# Patient Record
Sex: FEMALE | Race: WHITE | Employment: FULL TIME | URBAN - METROPOLITAN AREA
[De-identification: names, ages, dates, MRNs, and addresses within clinical notes are randomized per-mention and may not be internally consistent; named-entity substitution may affect disease eponyms.]

---

## 2022-03-24 RX ORDER — BACLOFEN 20 MG
500 TABLET ORAL DAILY
COMMUNITY

## 2022-03-24 RX ORDER — NEBIVOLOL 2.5 MG/1
2.5 TABLET ORAL DAILY
COMMUNITY

## 2022-03-24 RX ORDER — LEVOTHYROXINE AND LIOTHYRONINE 38; 9 UG/1; UG/1
60 TABLET ORAL DAILY
COMMUNITY

## 2022-03-24 RX ORDER — ESTRADIOL 0.5 MG/.5G
0.5 GEL TOPICAL DAILY
COMMUNITY

## 2022-03-24 RX ORDER — OMALIZUMAB 150 MG/ML
150 INJECTION, SOLUTION SUBCUTANEOUS
COMMUNITY

## 2022-03-24 NOTE — PERIOP NOTES
[facility]  Ambulatory Surgery Unit  Pre-operative Instructions    Surgery/Procedure Date  Friday April 1st            Tentative Arrival Time TBD      1. On the day of your surgery/procedure, please report to the Ambulatory Surgery Unit Registration Desk and sign in at your designated time. The Ambulatory Surgery Unit is located in [facility] on the [facility] side of the [facility] across from the [facility]. Please have all of your health insurance cards and a photo ID. **Due to current COVID restrictions, only ONE adult may accompany you the day of the procedure. We have limited seating available. If our waiting room is at capacity, your ride may be asked to remain in their vehicle. No children are allowed in the waiting room. 2. You must have someone with you to drive you home, as you should not drive a car for 24 hours following anesthesia. Please make arrangements for a responsible adult friend or family member to stay with you for at least the first 24 hours after your surgery. 3. Do not have anything to eat or drink (including water, gum, mints, coffee, juice) after 11:59 PM  Thursday 3/31/2022. This may not apply to medications prescribed by your physician. (Please note below the special instructions with medications to take the morning of surgery, if applicable.)    4. We recommend you do not drink any alcoholic beverages for 24 hours before and after your surgery. 5. Contact your surgeons office for instructions on the following medications: non-steroidal anti-inflammatory drugs (i.e. Advil, Aleve), vitamins, and supplements. (Some surgeons will want you to stop these medications prior to surgery and others may allow you to take them)   **If you are currently taking Plavix, Coumadin, Aspirin and/or other blood-thinning agents, contact your surgeon for instructions. ** Your surgeon will partner with the physician prescribing these medications to determine if it is safe to stop or if you need to continue taking. Please do not stop taking these medications without instructions from your surgeon. 6. In an effort to help prevent surgical site infection, we ask that you shower with an anti-bacterial soap (i.e. Dial/Safeguard, or the soap provided to you at your preadmission testing appointment) for 3 days prior to and on the morning of surgery, using a fresh towel after each shower. (Please begin this process with fresh bed linens.) Do not apply any lotions, powders, or deodorants after the shower on the day of your procedure. If applicable, please do not shave the operative site for 48 hours prior to surgery. 7. Wear comfortable clothes. Wear glasses instead of contacts. Do not bring any jewelry or money (other than copays or fees as instructed). Do not wear make-up, particularly mascara, the morning of your surgery. Do not wear nail polish, particularly if you are having foot /hand surgery. Wear your hair loose or down, no ponytails, buns, trung pins or clips. All body piercings must be removed. 8. You should understand that if you do not follow these instructions your surgery may be cancelled. If your physical condition changes (i.e. fever, cold or flu) please contact your surgeon as soon as possible. 9. It is important that you be on time. If a situation occurs where you may be late, or if you have any questions or problems, please call [phone].    10. Your surgery time may be subject to change. You will receive a phone call the day prior to surgery to confirm your arrival time. 11. Pediatric patients: please bring a change of clothes, diapers, bottle/sippy cup, pacifier, etc.      Special Instructions: Take all medications and inhalers, as prescribed, on the morning of surgery with a sip of water EXCEPT: n/a      Insulin Dependent Diabetic patients: Take your diabetic medications as prescribed the day before surgery.   Hold all diabetic For information on Fall & Injury Prevention, visit [email or web address] medications the day of surgery. If you are scheduled to arrive for surgery after 8:00 AM, and your AM blood sugar is >200, please call Ambulatory Surgery. I understand a pre-operative phone call will be made to verify my surgery time. In the event that I am not available, I give permission for a message to be left on my answering service and/or with another person?       Yes    Reviewed instructions via telephone, patient verbalized understanding         ___________________      ___________________      ________________  (Signature of Patient)          (Witness)                   (Date and Time)

## 2022-03-25 RX ORDER — IBUPROFEN 200 MG
400 TABLET ORAL
COMMUNITY

## 2022-03-25 RX ORDER — ACETAMINOPHEN 500 MG
1000 TABLET ORAL
COMMUNITY

## 2022-03-25 RX ORDER — MELOXICAM 15 MG/1
15 TABLET ORAL
COMMUNITY

## 2022-03-30 PROBLEM — M75.111 NONTRAUMATIC INCOMPLETE TEAR OF RIGHT ROTATOR CUFF: Status: ACTIVE | Noted: 2022-03-30

## 2022-03-30 NOTE — H&P
PRE- OP History and Physical                             Subjective:     Patient is a 73 y.o. female presented with a history of R shoulder pain. She states that she plays pickleball multiple times per week and notes that she had a sharp pain with an overhead hit. She notes of previous treatment of physical therapy and Medrol Dosepak which has failed to provide relief. Her pain is persistent and she notes of night awakening due to pain. She notes of clicking in her R shoulder. She rates her pain a 6/10 on the pain scale. .  The patient's condition has been resistant to non-operative treatment and is being admitted for surgical management of this condition. 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 Authorizing Provider   meloxicam (MOBIC) 15 mg tablet Take 15 mg by mouth daily as needed for Pain. Yes Provider, Historical   acetaminophen (TYLENOL) 500 mg tablet Take 1,000 mg by mouth every six (6) hours as needed for Pain. Yes Provider, Historical   ibuprofen (MOTRIN) 200 mg tablet Take 400 mg by mouth every eight (8) hours as needed for Pain. Yes Provider, Historical   fluticasone propionate (FLOVENT DISKUS) 50 mcg/actuation inhaler Take 1 Puff by inhalation two (2) times a day. Yes Provider, Historical   thyroid, Pork, ([place] Thyroid) 60 mg tablet Take 60 mg by mouth daily.    Yes Provider, Historical   omalizumab (Xolair) 150 mg/mL syrg 150 mg by SubCUTAneous route every thirty (30) days. Yes Provider, Historical   Estradiol (DivigeL) 0.5 mg/0.5 gram (0.1 %) glpk 0.5 mg by TransDERmal route daily. Yes Provider, Historical   magnesium oxide Nathaniel Long) 500 mg tab Take 500 mg by mouth daily. 500-1000 mg daily   Yes Provider, Historical   nebivoloL (Bystolic) 2.5 mg tablet Take 2.5 mg by mouth daily. Yes Provider, Historical     Allergies   Allergen Reactions    Amoxicillin Hives    Oxycodone Anxiety     Pt states makes her anxious, antsy, and nauseous     Tramadol Nausea and Vomiting      Social History     Tobacco Use    Smoking status: Never Smoker    Smokeless tobacco: Never Used   Substance Use Topics    Alcohol use: Yes     Alcohol/week: 2.0 standard drinks     Types: 1 Glasses of wine, 1 Shots of liquor per week     Comment: once per month      History reviewed. No pertinent family history. Review of Systems  A comprehensive review of systems was negative except for that written in the HPI. Objective:     No data found. Visit Vitals  Ht 5' 4\" (1.626 m)   Wt 74.4 kg (164 lb)   BMI 28.15 kg/m²     General:  Alert, cooperative, no distress, appears stated age. Head:  Normocephalic, without obvious abnormality, atraumatic. Eyes:  Conjunctivae/corneas clear. PERRL, EOMs intact. Ears:  Normal TMs and external ear canals both ears. Nose: Nares normal. Septum midline. Mucosa normal. No drainage or sinus tenderness. Throat: Lips, mucosa, and tongue normal. Teeth and gums normal.   Neck: Supple, symmetrical, trachea midline, no adenopathy, thyroid: no enlargement/tenderness/nodules, no carotid bruit and no JVD. Back:   Symmetric, no curvature. ROM normal. No CVA tenderness. Lungs:   Clear to auscultation bilaterally. Chest wall:  No tenderness or deformity. Heart:  Regular rate and rhythm, S1, S2, no murmur, click, rub or gallop. Abdomen:   Soft, non-tender.  Bowel sounds normal. No masses,  No organomegaly. Extremities: Extremities normal except Physical exam of R shoulder shows , external rotation 70, internal rotation T8, positive AC pain, positive cross body abduction, negative jerk, normal skin, normal pulse. , atraumatic, no cyanosis or edema. Pulses: 2+ and symmetric all extremities. Skin: Skin color, texture, turgor normal. No rashes or lesions   Lymph nodes: Cervical, supraclavicular, and axillary nodes normal.   Neurologic: CNII-XII intact. Neurovascular exam intact in distal extremities        Imaging Review  I have personally and independently reviewed MRI and the report of R shoulder on 2/28/2022, which reveals grade 3 partial rotator cuff tear and AC arthritis. Assessment:     Active Problems:    Nontraumatic incomplete tear of right rotator cuff (3/30/2022)        Plan:   Juan Villagran has grade 3 partial rotator cuff tear in her R shoulder. I discussed the natural history and natural progression of diagnoses. I reviewed the patient's medical record, previous office notes, and  discussed findings, imaging, impression, treatment options, and plan with the patient. Treatment options discussed to include no intervention, prescription strength oral anti-inflammatories, cortisone injections, oral steroids, therapy, MRI, and surgery.      This is an ongoing problem. Previous conservative treatment of Medrol Dosepak and physical therapy has failed to provide relief. MRI shows grade 3 partial rotator cuff tear and AC arthritis. She has an abnormal AC joint as seen on MRI but no symptoms during physical exam so we will just fix the rotator cuff. Discussed surgical intervention of arthroscopic rotator cuff repair. Pros, cons, and recovery time of surgery was discussed.      Discussed that she is high risk, due to demographics, for adhesive capsulitis.  Discussed that if adhesive capsulitis is present at the time of surgery then surgery will not be performed at the time. Wait for adhesive capsulitis to resolve prior to surgical repair.      She states that she can't take NSAIDs or pain killers due to side effects in the kidneys.      After discussion and answering questions, it was elected to proceed with arthroscopic rotator cuff repair. Handout on surgery was given. Follow up for surgery.     Operative and non-operative treatments have been discussed with the patient including risks and benefits of each. After consideration of risks, benefits limitations to the consented procedures and alternative options for treatment, the patient has consented to surgical interventions. Questions were answered and Pre-op teaching was completed.       HEATHER Mitchell

## 2022-03-31 ENCOUNTER — ANESTHESIA EVENT (OUTPATIENT)
Dept: SURGERY | Age: 67
End: 2022-03-31
Payer: COMMERCIAL

## 2022-03-31 NOTE — PERIOP NOTES
Patient called with question about which facility which patient is scheduled here at [facility] and to add allergy on to list of Oxycodone

## 2022-04-01 ENCOUNTER — ANESTHESIA (OUTPATIENT)
Dept: SURGERY | Age: 67
End: 2022-04-01
Payer: COMMERCIAL

## 2022-04-01 ENCOUNTER — HOSPITAL ENCOUNTER (OUTPATIENT)
Age: 67
Setting detail: OUTPATIENT SURGERY
Discharge: HOME | End: 2022-04-01
Attending: ORTHOPAEDIC SURGERY | Admitting: ORTHOPAEDIC SURGERY
Payer: COMMERCIAL

## 2022-04-01 VITALS
HEART RATE: 55 BPM | WEIGHT: 164 LBS | OXYGEN SATURATION: 95 % | DIASTOLIC BLOOD PRESSURE: 88 MMHG | HEIGHT: 64 IN | BODY MASS INDEX: 28 KG/M2 | TEMPERATURE: 97.6 F | SYSTOLIC BLOOD PRESSURE: 139 MMHG | RESPIRATION RATE: 12 BRPM

## 2022-04-01 DIAGNOSIS — M75.111 NONTRAUMATIC INCOMPLETE TEAR OF RIGHT ROTATOR CUFF: Primary | ICD-10-CM

## 2022-04-01 PROCEDURE — 2709999900 HC NON-CHARGEABLE SUPPLY: Performed by: ORTHOPAEDIC SURGERY

## 2022-04-01 PROCEDURE — 77030012711 HC WND ARTHRO ABLT S&N -D: Performed by: ORTHOPAEDIC SURGERY

## 2022-04-01 PROCEDURE — 77030004451 HC BUR SHV S&N -B: Performed by: ORTHOPAEDIC SURGERY

## 2022-04-01 PROCEDURE — 76030000001 HC AMB SURG OR TIME 1 TO 1.5: Performed by: ORTHOPAEDIC SURGERY

## 2022-04-01 PROCEDURE — 76210000034 HC AMBSU PH I REC 0.5 TO 1 HR: Performed by: ORTHOPAEDIC SURGERY

## 2022-04-01 PROCEDURE — 77030013079 HC BLNKT BAIR HGGR 3M -A: Performed by: NURSE ANESTHETIST, CERTIFIED REGISTERED

## 2022-04-01 PROCEDURE — 77030003598 HC NDL MULT/FIRE ARTH -C: Performed by: ORTHOPAEDIC SURGERY

## 2022-04-01 PROCEDURE — 77030008496 HC TBNG ARTHSC IRR S&N -B: Performed by: ORTHOPAEDIC SURGERY

## 2022-04-01 PROCEDURE — 77030021352 HC CBL LD SYS DISP COVD -B: Performed by: ORTHOPAEDIC SURGERY

## 2022-04-01 PROCEDURE — 74011250636 HC RX REV CODE- 250/636: Performed by: ANESTHESIOLOGY

## 2022-04-01 PROCEDURE — 74011250636 HC RX REV CODE- 250/636: Performed by: ORTHOPAEDIC SURGERY

## 2022-04-01 PROCEDURE — 74011000250 HC RX REV CODE- 250: Performed by: NURSE ANESTHETIST, CERTIFIED REGISTERED

## 2022-04-01 PROCEDURE — 77030037837: Performed by: ORTHOPAEDIC SURGERY

## 2022-04-01 PROCEDURE — 77030019908 HC STETH ESOPH SIMS -A: Performed by: NURSE ANESTHETIST, CERTIFIED REGISTERED

## 2022-04-01 PROCEDURE — 77030020143 HC AIRWY LARYN INTUB CGAS -A: Performed by: NURSE ANESTHETIST, CERTIFIED REGISTERED

## 2022-04-01 PROCEDURE — 77030034038 HC BLD SHV DYON ACRMNZR S&N -B: Performed by: ORTHOPAEDIC SURGERY

## 2022-04-01 PROCEDURE — 77030018834: Performed by: ORTHOPAEDIC SURGERY

## 2022-04-01 PROCEDURE — C1713 ANCHOR/SCREW BN/BN,TIS/BN: HCPCS | Performed by: ORTHOPAEDIC SURGERY

## 2022-04-01 PROCEDURE — 77030002916 HC SUT ETHLN J&J -A: Performed by: ORTHOPAEDIC SURGERY

## 2022-04-01 PROCEDURE — 76060000062 HC AMB SURG ANES 1 TO 1.5 HR: Performed by: ORTHOPAEDIC SURGERY

## 2022-04-01 PROCEDURE — 74011250636 HC RX REV CODE- 250/636: Performed by: NURSE ANESTHETIST, CERTIFIED REGISTERED

## 2022-04-01 PROCEDURE — 76210000050 HC AMBSU PH II REC 0.5 TO 1 HR: Performed by: ORTHOPAEDIC SURGERY

## 2022-04-01 PROCEDURE — 74011250636 HC RX REV CODE- 250/636

## 2022-04-01 DEVICE — ANCHOR SUTURE SFT 2.6 MM TRPL LD FIBERWIRE CL FIBERTAK: Type: IMPLANTABLE DEVICE | Site: SHOULDER | Status: FUNCTIONAL

## 2022-04-01 RX ORDER — VANCOMYCIN/0.9 % SOD CHLORIDE 1.5G/250ML
1500 PLASTIC BAG, INJECTION (ML) INTRAVENOUS ONCE
Status: DISCONTINUED | OUTPATIENT
Start: 2022-04-01 | End: 2022-04-01

## 2022-04-01 RX ORDER — MORPHINE SULFATE 10 MG/ML
2 INJECTION, SOLUTION INTRAMUSCULAR; INTRAVENOUS
Status: DISCONTINUED | OUTPATIENT
Start: 2022-04-01 | End: 2022-04-01 | Stop reason: HOSPADM

## 2022-04-01 RX ORDER — ROPIVACAINE HYDROCHLORIDE 5 MG/ML
INJECTION, SOLUTION EPIDURAL; INFILTRATION; PERINEURAL
Status: COMPLETED | OUTPATIENT
Start: 2022-04-01 | End: 2022-04-01

## 2022-04-01 RX ORDER — VANCOMYCIN HYDROCHLORIDE 1 G/20ML
INJECTION, POWDER, LYOPHILIZED, FOR SOLUTION INTRAVENOUS
Status: COMPLETED
Start: 2022-04-01 | End: 2022-04-01

## 2022-04-01 RX ORDER — LIDOCAINE HYDROCHLORIDE 10 MG/ML
0.1 INJECTION, SOLUTION EPIDURAL; INFILTRATION; INTRACAUDAL; PERINEURAL AS NEEDED
Status: DISCONTINUED | OUTPATIENT
Start: 2022-04-01 | End: 2022-04-01 | Stop reason: HOSPADM

## 2022-04-01 RX ORDER — DEXAMETHASONE SODIUM PHOSPHATE 4 MG/ML
INJECTION, SOLUTION INTRA-ARTICULAR; INTRALESIONAL; INTRAMUSCULAR; INTRAVENOUS; SOFT TISSUE AS NEEDED
Status: DISCONTINUED | OUTPATIENT
Start: 2022-04-01 | End: 2022-04-01 | Stop reason: HOSPADM

## 2022-04-01 RX ORDER — ROPIVACAINE HYDROCHLORIDE 5 MG/ML
INJECTION, SOLUTION EPIDURAL; INFILTRATION; PERINEURAL
Status: COMPLETED
Start: 2022-04-01 | End: 2022-04-01

## 2022-04-01 RX ORDER — SODIUM CHLORIDE 9 MG/ML
INJECTION, SOLUTION INTRAVENOUS
Status: DISCONTINUED
Start: 2022-04-01 | End: 2022-04-01 | Stop reason: HOSPADM

## 2022-04-01 RX ORDER — DIPHENHYDRAMINE HYDROCHLORIDE 50 MG/ML
12.5 INJECTION, SOLUTION INTRAMUSCULAR; INTRAVENOUS AS NEEDED
Status: DISCONTINUED | OUTPATIENT
Start: 2022-04-01 | End: 2022-04-01 | Stop reason: HOSPADM

## 2022-04-01 RX ORDER — MECLIZINE HYDROCHLORIDE 25 MG/1
TABLET ORAL
Status: DISCONTINUED
Start: 2022-04-01 | End: 2022-04-01 | Stop reason: HOSPADM

## 2022-04-01 RX ORDER — PROPOFOL 10 MG/ML
INJECTION, EMULSION INTRAVENOUS AS NEEDED
Status: DISCONTINUED | OUTPATIENT
Start: 2022-04-01 | End: 2022-04-01 | Stop reason: HOSPADM

## 2022-04-01 RX ORDER — MIDAZOLAM HYDROCHLORIDE 1 MG/ML
INJECTION, SOLUTION INTRAMUSCULAR; INTRAVENOUS AS NEEDED
Status: DISCONTINUED | OUTPATIENT
Start: 2022-04-01 | End: 2022-04-01 | Stop reason: HOSPADM

## 2022-04-01 RX ORDER — FENTANYL CITRATE 50 UG/ML
INJECTION, SOLUTION INTRAMUSCULAR; INTRAVENOUS AS NEEDED
Status: DISCONTINUED | OUTPATIENT
Start: 2022-04-01 | End: 2022-04-01 | Stop reason: HOSPADM

## 2022-04-01 RX ORDER — SODIUM CHLORIDE 0.9 % (FLUSH) 0.9 %
5-40 SYRINGE (ML) INJECTION AS NEEDED
Status: DISCONTINUED | OUTPATIENT
Start: 2022-04-01 | End: 2022-04-01 | Stop reason: HOSPADM

## 2022-04-01 RX ORDER — HYDROMORPHONE HYDROCHLORIDE 1 MG/ML
.2-.5 INJECTION, SOLUTION INTRAMUSCULAR; INTRAVENOUS; SUBCUTANEOUS ONCE
Status: DISCONTINUED | OUTPATIENT
Start: 2022-04-01 | End: 2022-04-01 | Stop reason: HOSPADM

## 2022-04-01 RX ORDER — SODIUM CHLORIDE 0.9 % (FLUSH) 0.9 %
5-40 SYRINGE (ML) INJECTION EVERY 8 HOURS
Status: DISCONTINUED | OUTPATIENT
Start: 2022-04-01 | End: 2022-04-01 | Stop reason: HOSPADM

## 2022-04-01 RX ORDER — SODIUM CHLORIDE, SODIUM LACTATE, POTASSIUM CHLORIDE, CALCIUM CHLORIDE 600; 310; 30; 20 MG/100ML; MG/100ML; MG/100ML; MG/100ML
25 INJECTION, SOLUTION INTRAVENOUS CONTINUOUS
Status: DISCONTINUED | OUTPATIENT
Start: 2022-04-01 | End: 2022-04-01 | Stop reason: HOSPADM

## 2022-04-01 RX ORDER — DROPERIDOL 2.5 MG/ML
0.62 INJECTION, SOLUTION INTRAMUSCULAR; INTRAVENOUS AS NEEDED
Status: DISCONTINUED | OUTPATIENT
Start: 2022-04-01 | End: 2022-04-01 | Stop reason: HOSPADM

## 2022-04-01 RX ORDER — LIDOCAINE HYDROCHLORIDE 20 MG/ML
INJECTION, SOLUTION INFILTRATION; PERINEURAL
Status: DISCONTINUED
Start: 2022-04-01 | End: 2022-04-01 | Stop reason: HOSPADM

## 2022-04-01 RX ORDER — ONDANSETRON 2 MG/ML
INJECTION INTRAMUSCULAR; INTRAVENOUS AS NEEDED
Status: DISCONTINUED | OUTPATIENT
Start: 2022-04-01 | End: 2022-04-01 | Stop reason: HOSPADM

## 2022-04-01 RX ORDER — LIDOCAINE HYDROCHLORIDE 20 MG/ML
INJECTION, SOLUTION EPIDURAL; INFILTRATION; INTRACAUDAL; PERINEURAL AS NEEDED
Status: DISCONTINUED | OUTPATIENT
Start: 2022-04-01 | End: 2022-04-01 | Stop reason: HOSPADM

## 2022-04-01 RX ORDER — KETOROLAC TROMETHAMINE 30 MG/ML
INJECTION, SOLUTION INTRAMUSCULAR; INTRAVENOUS AS NEEDED
Status: DISCONTINUED | OUTPATIENT
Start: 2022-04-01 | End: 2022-04-01 | Stop reason: HOSPADM

## 2022-04-01 RX ORDER — FENTANYL CITRATE 50 UG/ML
25 INJECTION, SOLUTION INTRAMUSCULAR; INTRAVENOUS
Status: DISCONTINUED | OUTPATIENT
Start: 2022-04-01 | End: 2022-04-01 | Stop reason: HOSPADM

## 2022-04-01 RX ORDER — GABAPENTIN 300 MG/1
300 CAPSULE ORAL
Qty: 3 CAPSULE | Refills: 0 | Status: SHIPPED | OUTPATIENT
Start: 2022-04-01

## 2022-04-01 RX ORDER — EPHEDRINE SULFATE/0.9% NACL/PF 50 MG/5 ML
SYRINGE (ML) INTRAVENOUS AS NEEDED
Status: DISCONTINUED | OUTPATIENT
Start: 2022-04-01 | End: 2022-04-01 | Stop reason: HOSPADM

## 2022-04-01 RX ORDER — MIDAZOLAM HYDROCHLORIDE 1 MG/ML
INJECTION, SOLUTION INTRAMUSCULAR; INTRAVENOUS
Status: COMPLETED
Start: 2022-04-01 | End: 2022-04-01

## 2022-04-01 RX ORDER — ONDANSETRON 4 MG/1
4 TABLET, FILM COATED ORAL
Qty: 10 TABLET | Refills: 1 | Status: SHIPPED | OUTPATIENT
Start: 2022-04-01

## 2022-04-01 RX ORDER — MECLIZINE HYDROCHLORIDE 25 MG/1
25 TABLET ORAL ONCE
Status: COMPLETED | OUTPATIENT
Start: 2022-04-01 | End: 2022-04-01

## 2022-04-01 RX ORDER — KETOROLAC TROMETHAMINE 10 MG/1
10 TABLET, FILM COATED ORAL EVERY 6 HOURS
Qty: 12 TABLET | Refills: 0 | Status: SHIPPED | OUTPATIENT
Start: 2022-04-01 | End: 2022-04-04

## 2022-04-01 RX ORDER — HYDROCODONE BITARTRATE AND ACETAMINOPHEN 5; 325 MG/1; MG/1
1 TABLET ORAL
Qty: 40 TABLET | Refills: 0 | Status: SHIPPED | OUTPATIENT
Start: 2022-04-01 | End: 2022-04-06

## 2022-04-01 RX ADMIN — SODIUM CHLORIDE, POTASSIUM CHLORIDE, SODIUM LACTATE AND CALCIUM CHLORIDE 25 ML/HR: 600; 310; 30; 20 INJECTION, SOLUTION INTRAVENOUS at 09:07

## 2022-04-01 RX ADMIN — FENTANYL CITRATE 25 MCG: 50 INJECTION, SOLUTION INTRAMUSCULAR; INTRAVENOUS at 10:32

## 2022-04-01 RX ADMIN — MIDAZOLAM HYDROCHLORIDE 3 MG: 1 INJECTION, SOLUTION INTRAMUSCULAR; INTRAVENOUS at 09:41

## 2022-04-01 RX ADMIN — MECLIZINE HYDROCHLORIDE 25 MG: 25 TABLET ORAL at 09:21

## 2022-04-01 RX ADMIN — FENTANYL CITRATE 25 MCG: 50 INJECTION, SOLUTION INTRAMUSCULAR; INTRAVENOUS at 10:00

## 2022-04-01 RX ADMIN — ONDANSETRON HYDROCHLORIDE 4 MG: 2 INJECTION, SOLUTION INTRAMUSCULAR; INTRAVENOUS at 09:55

## 2022-04-01 RX ADMIN — LIDOCAINE HYDROCHLORIDE 60 MG: 20 INJECTION, SOLUTION EPIDURAL; INFILTRATION; INTRACAUDAL; PERINEURAL at 09:48

## 2022-04-01 RX ADMIN — PROPOFOL 150 MG: 10 INJECTION, EMULSION INTRAVENOUS at 09:48

## 2022-04-01 RX ADMIN — ROPIVACAINE HYDROCHLORIDE 30 ML: 5 INJECTION, SOLUTION EPIDURAL; INFILTRATION; PERINEURAL at 09:25

## 2022-04-01 RX ADMIN — Medication 10 MG: at 10:18

## 2022-04-01 RX ADMIN — Medication 10 MG: at 09:57

## 2022-04-01 RX ADMIN — FENTANYL CITRATE 50 MCG: 50 INJECTION, SOLUTION INTRAMUSCULAR; INTRAVENOUS at 09:48

## 2022-04-01 RX ADMIN — DEXAMETHASONE SODIUM PHOSPHATE 8 MG: 4 INJECTION, SOLUTION INTRAMUSCULAR; INTRAVENOUS at 09:55

## 2022-04-01 RX ADMIN — KETOROLAC TROMETHAMINE 30 MG: 30 INJECTION, SOLUTION INTRAMUSCULAR; INTRAVENOUS at 10:25

## 2022-04-01 RX ADMIN — VANCOMYCIN HYDROCHLORIDE 1000 MG: 1 INJECTION, POWDER, LYOPHILIZED, FOR SOLUTION INTRAVENOUS at 09:07

## 2022-04-01 NOTE — DISCHARGE INSTRUCTIONS
Jacoby Newman M.D. Speedy Lopez PA-C  Knee & Shoulder Surgery  Sports Medicine  [phone]     Rotator Cuff Repair Post-Operative Instructions      >>>You received Toradol during your surgery. You may not take any form of NSAIDS (non steroidal anti inflammatory drugs) such as Advil, Ibuprofen, Aleve, Motrin until 4:25pm with food and on schedule      Activity Restrictions:  No lifting your operative arm. You may extend and bend your elbow and you may also move your wrist. You may perform minimal arm dangle exercises for personal hygiene. Pain Control: We manage post-operative pain with a combination of tools including the nerve block, ice, Tylenol, Toradol, Gabapentin and pain medicine. We encourage you to use Tylenol, 500 mg every 6 hours, only for the first 5 days. After that use only 500 mg every 6 hours. You will use Toradol 10mg every 6 hours for the first 3 days only. Gabapentin 300mg is used only at night for the first 3 nights. We prescribe pain medicine, typically oxycodone unless you have a known adverse reaction and you will use this as needed, mostly the first 3 days and generally no more than 1 week. Sling: You will use your sling for 5 weeks. You may remove your arm from the sling for showers. You may also remove your arm from the sling and let your arm hang down so your elbow doesnt get stiff. You are encouraged to perform hand, wrist and elbow range of motion exercises, as well as shoulder blade pinches, 4-5 times per day. These exercises will help decrease swelling and stiffness in the elbow and wrist. No dangle exercises until 2 weeks after surgery. The essential point is that you are NOT allowed to lift your elbow away from your side under its own power for the first 5 weeks. Dressing: Your dressing will be left in place for 48 hours from your surgery time. You may notice bloody drainage on the dressing. That is normal.  You will remove the dressing 2 days after surgery and cover the incisions with circular band-aids. Shower with band-aids on, then remove and replace the band-aids after showers. Sutures will be removed at your first post-op appointment about 1 week after surgery. Swelling:  Swelling of the biceps, forearm, and hand is very common after surgery. You may even notice some bruising or discoloration of skin. This is normal.  You may use a stress ball and by performing motion of the wrist and elbow the swelling will dissipate. For severe swelling we may arrange for hand therapy if needed. Sleeping:   Patients are generally more comfortable sleeping in a reclining chair or in bed with pillows propped behind the shoulder. You can wear your sling when sleeping, or you may remove the sling and just slide a bed pillow underneath your arm and sleep like that. Some difficulty sleeping is common for 2-3 weeks after surgery. Therapy:    A PT prescription is typically provided in your post-op packet. We will discuss when to start therapy at your first post-op appointment. Therapy is generally 2x a week for the first 10-12 weeks. Your physical therapist will progress your activity appropriately according to our post-op protocol. Medications: You should resume your daily medication for other medical conditions the day after surgery. You will go home with a pain medication prescription, typically Oxycodone unless you have an allergy to this medicine. If you have an adverse reaction to the pain medicine call [phone] or after business hours [phone]. DO NOT wait until you are in a lot of pain before taking the medication. It takes the medication 30-45 minutes to take effect. When using Tylenol or acetaminophen for the first 5 days you may use 500 mg every 6 hours because of may need Norco (narcotic which has Acetaminophen) May take 1000mg if you know not taking [address]. After the first 5 days reduce to 500 mg every 6 hours. The use of narcotics can lead to constipation. A high fiber diet and lots of fluids can prevent this occurring. Over the counter laxatives can be used as directed on the label. We recommend taking both Miralax and Pericolace to help with constipation. If a refill of medication is needed, please call the office during regular business hours, Monday through Friday 8:00 a.m. to 5:00 p.m. Dr. Richelle Arzola may not readily available to sign a prescription so it is important to call ahead. Refills will not be made after hours, so please plan ahead. We also cannot guarantee a same day prescription. Itching:  Itching/rash is a common post op complaint. It can be caused from many different perioperative causes including but not limited to pain medicine, adhesive tapes, and surgical skin preparations. If you experience moderate to severe itching we recommend using over-the-counter oral Zyrtec (cetrizine) and/or Benadryl as directed on the package. Driving: DO NOT drive with narcotics in your system. You may begin driving 1 week after surgery. It is OK to drive in your sling, just follow the same rules. No lifting your elbow away from your side. Return to school/work: This depends on your job requirements and level of activity. If you work at a desk job or in a supervisory role, you may return as early as 3-4 days after surgery. Average return to physical labor is 4-6 months post-op. Follow Up: You will be given an appointment card for your follow up appointment at our [facility]. At that time your sutures will be removed and physical therapy will be arranged if necessary. IF PROBLEMS ARISE, PLEASE CALL THE OFFICE AT [phone]         DO NOT TAKE TYLENOL/ACETAMINOPHEN WITH PERCOCET, LORTAB, [address]. TAKE NARCOTIC PAIN MEDICATIONS WITH FOOD!     For the night of surgery, while block is still in effect, start with 1 pain pill at bedtime    Narcotics tend to be constipating and we recommend taking a stool softener such as Colace or Miralax (follow package instructions). If you were given prescriptions, please review the written information on the prescribed medications. DO NOT DRIVE WHILE TAKING NARCOTIC PAIN MEDICATIONS. CPAP PATIENTS BE SURE TO WEAR MACHINE WHENEVER NAPPING OR SLEEPING DAY/NIGHT OF SURGERY! DISCHARGE SUMMARY from Nurse    The following personal items collected during your admission are returned to you:   Dental Appliance: Dental Appliances: None  Vision: Visual Aid: None  Hearing Aid:    Jewelry: Jewelry: None  Clothing: Clothing: With patient  Other Valuables:    Valuables sent to safe:        PATIENT INSTRUCTIONS:    After General Anesthesia or Intravenous Sedation, for 24 hours or while taking prescription Narcotics:  · Someone should be with you for the next 24 hours. · For your own safety, a responsible adult must drive you home. · Limit your activities  · Recommended activity: Rest today, up with assistance today. Do not climb stairs or shower unattended for the next 24 hours. · Start with a soft bland diet and advance as tolerated (no nausea) to regular diet. · If you have a sore throat some things that may help are: fluids, warm salt water gargle, or throat lozenges. If this does not improve after several days please follow up with your family physician. · Do not drive and operate hazardous machinery  · Do not make important personal or business decisions  · Do  not drink alcoholic beverages  · If you have not urinated within 8 hours after discharge, please contact your surgeon on call.       Report the following to your surgeon:  · Excessive pain, swelling, redness or odor of or around the surgical area  · Temperature over 100.5  · Nausea and vomiting lasting longer than 4 hours or if unable to take medications  · Any signs of decreased circulation or nerve impairment to extremity: change in color, persistent  numbness, tingling, coldness or increase pain    · If you received an upper extremity nerve block, please wear your sling until the block has worn off, then refer to your surgeons post-operative instructions. If you have had a shoulder block or a block near your collar bone, you may have              symptoms such as:          1. Mild shortness of breath        2. A hoarse voice        3. Blurry vision        4. Unequal pupils        5. Drooping of your face on the same side as the nerve block. These symptoms will disappear as the nerve block wears off. ·      · You will receive a Post Operative Call from one of the Recovery Room Nurses on the day after your surgery to check on you. It is very important for us to know how you are recovering after your surgery. If you have an issue please call your surgeon, do not wait for the post operative call. · You may receive an e-mail or letter in the mail from [employer] regarding your experience with us in the Ambulatory Surgery Unit. Your feedback is valuable to us and we appreciate your participation in the survey. ·   · If the above instructions are not adequate or you are having problems after your surgery, call your physician at their office number. ·   · We wish youre a speedy recovery ? What to do at Home:    *  Please give a list of your current medications to your Primary Care Provider. *  Please update this list whenever your medications are discontinued, doses are      changed, or new medications (including over-the-counter products) are added. *  Please carry medication information at all times in case of emergency situations. If you have not had your influenza or pneumococcal vaccines, please follow up with your primary care physician. The discharge information has been reviewed with the patient and caregiver. The patient and caregiver verbalized understanding.

## 2022-04-01 NOTE — PERIOP NOTES
Pt. Alert. Denies pain or chill. Discharge instructions reviewed with caregiver and patient. Allowed and answered questions. Tolerating PO fluids. Both state ready for discharge. 1219 Discharged to car with arm in sling.  shown sling and website and written instructions to go home with.

## 2022-04-01 NOTE — ANESTHESIA PROCEDURE NOTES
Peripheral Block    Start time: 4/1/2022 9:18 AM  End time: 4/1/2022 9:28 AM  Performed by: Peña Toth MD  Authorized by: Peña Toth MD       Pre-procedure: Indications: at surgeon's request and post-op pain management    Preanesthetic Checklist: patient identified, risks and benefits discussed, site marked, timeout performed, anesthesia consent given and patient being monitored    Timeout Time: 09:20 EDT          Block Type:   Block Type:   Interscalene  Laterality:  Right  Monitoring:  Standard ASA monitoring, responsive to questions and oxygen  Injection Technique:  Single shot  Procedures: ultrasound guided    Patient Position: supine  Prep: chlorhexidine    Location:  Interscalene  Needle Type:  Stimuplex  Needle Gauge:  22 G  Needle Localization:  Ultrasound guidance  Medication Injected:  Ropivacaine (PF) (NAROPIN)(0.5%) 5 mg/mL injection, 30 mL  Med Admin Time: 4/1/2022 9:25 AM    Assessment:  Number of attempts:  1  Injection Assessment:  Incremental injection every 5 mL, no paresthesia, ultrasound image on chart, local visualized surrounding nerve on ultrasound, negative aspiration for blood and no intravascular symptoms

## 2022-04-01 NOTE — PERIOP NOTES
Air Warming blanket placed on pt; turned on for comfort    Permission received to review discharge instructions and discuss private health information with   and will have someone with them after discharge     [id number]  Dr. Arlette Del Rosario performed a RU   Extremity   nerve block with the U/S machine. PT  was on cardiac monitoring, pulse oximetry and 3L NC O2.  Pt. Was given  3 mg of versed  IV for sedation. VSS. Pt.  Slightly drowsy easy to arouse  post block.     T.o [id number]

## 2022-04-01 NOTE — ANESTHESIA PREPROCEDURE EVALUATION
Relevant Problems   No relevant active problems       Anesthetic History     PONV ( possible motion sickness)          Review of Systems / Medical History  Patient summary reviewed, nursing notes reviewed and pertinent labs reviewed    Pulmonary  Within defined limits                 Neuro/Psych   Within defined limits           Cardiovascular    Hypertension ( just started BP med)              Exercise tolerance: >4 METS     GI/Hepatic/Renal  Within defined limits              Endo/Other      Hypothyroidism: well controlled      Comments: Autoimmune urticaria Other Findings   Comments: Right shoulder rotator cuff tear         Physical Exam    Airway  Mallampati: II  TM Distance: 4 - 6 cm  Neck ROM: normal range of motion   Mouth opening: Normal     Cardiovascular    Rhythm: regular  Rate: normal         Dental  No notable dental hx       Pulmonary  Breath sounds clear to auscultation               Abdominal  GI exam deferred       Other Findings            Anesthetic Plan    ASA: 2  Anesthesia type: general and regional - interscalene block      Post-op pain plan if not by surgeon: peripheral nerve block single    Induction: Intravenous  Anesthetic plan and risks discussed with: Patient      Took BB at 7 pm yesterday.  PONV prophylaxis/ meclizine po preop

## 2022-04-01 NOTE — ANESTHESIA POSTPROCEDURE EVALUATION
Procedure(s):  RIGHT SHOULDER ARTHROSCOPY, ROTATOR CUFF REPAIR AND DISTAL CLAVICULECTOMY.     general, regional    Anesthesia Post Evaluation      Multimodal analgesia: multimodal analgesia used between 6 hours prior to anesthesia start to PACU discharge  Patient location during evaluation: PACU  Patient participation: complete - patient participated  Level of consciousness: awake and alert  Pain score: 0  Airway patency: patent  Anesthetic complications: no  Cardiovascular status: acceptable  Respiratory status: acceptable  Hydration status: acceptable  Comments: Pt has interscalene block; sling postop  Post anesthesia nausea and vomiting:  none  Final Post Anesthesia Temperature Assessment:  Normothermia (36.0-37.5 degrees C)      INITIAL Post-op Vital signs:   Vitals Value Taken Time   /79 04/01/22 1115   Temp 36.1 °C (97 °F) 04/01/22 1050   Pulse 62 04/01/22 1115   Resp 12 04/01/22 1115   SpO2 95 % 04/01/22 1115

## 2022-04-01 NOTE — PERIOP NOTES
France Fleischer  1955  [id number]    Situation:  Verbal report given from: RN and CRNA  Procedure: Procedure(s):  RIGHT SHOULDER ARTHROSCOPY, ROTATOR CUFF REPAIR AND DISTAL CLAVICULECTOMY    Background:    Preoperative diagnosis: RIGHT SHOULDER ROTATOR CUFF TEAR    Postoperative diagnosis: RIGHT SHOULDER ROTATOR CUFF TEAR    :  Dr. Lawson Dozier    Assistant(s): Circ-1: Jasmin Hernandez RN  Physician Assistant: HEATHER Lamas  Scrub Tech-1: Monica Mahmood    Specimens: * No specimens in log *    Assessment:  Intra-procedure medications         Anesthesia gave intra-procedure sedation and medications, see anesthesia flow sheet     Intravenous fluids: LR@ KVO     Vital signs stable.  Pt denies pain or chill      Recommendation:    Permission to share finding with  : yes

## 2022-04-01 NOTE — OP NOTES
Name: Haris Lozano MRN: [id number]    : 1955    Surgery Date: 2022     Date of Dictation: 2022    Admitting DX: RIGHT SHOULDER ROTATOR CUFF TEAR    Pre-OP DX:  Right Rotator Cuff Tear, AC arthralgia    Post OP DX: same    Procedure: Right Shoulder Arthroscopic Rotator Cuff Repair and arthroscopic distal claviculectomy    Surgeon: Susannah Gomez M.D. Asst: HOSSEIN Pitt Complications: none    Blood Loss: Minimal    Implants: Arthrex anchores    Antibiotics: Weight appropriate IV Ancef    Specimens: none    Anesthesia: GET with Interscalene Block    Indication: This patient has a symptomatic rotator cuff tear that comes in for repair. Patient has failed nonsurgical treatment. This is a complex surgical procedure requiring an assistant for patient positioning, for wound closure, but critically for what is a 4 handed operation requiring the assistant to hold the arthroscope while anchors are placed while sutures are thrown through the rotator cuff while knots are tied, for cannula stabilization and for suture management and an assistant is used. Procedure:  Patient is brought into the operative room theater and rolled over into the right side up position, beanbag X of plated, downside axillary roll placed, downside peroneal nerve padded, neck placed in neutral extension position the shoulders then prepped and draped in put in 10 lb of traction and 30 degrees of abduction and 20 degrees of forward flexion. After time out anterior and posterior arthroscopic portals were established in the glenohumeral joint was viewed in its entirety. The findings were as follows. Articular cartilage nl. Labrum nl. Biceps nl. Undersurface Rotator Cuff nl    We then left the glenohumeral space and entered the subacromial space. The rotator cuff tear was visualized, mobilized, the edges were freshened, the underlying tuberosity was roughened.   The characteristics of this tear are 1 cm wide 95% partial tear supraspinatus. This rotator cuff repair was repaired using a single row technique with Arthrex anchors, sutures placed using a scorpion through a lateral passport. All knots were tied using over over under post switch over post which under knot tying technique using a knot tyer while my assistant held the scope. Patient has symptomatc AC impingement also with huge medial acromial osteophyte, distal claviculectomy and medial acromioplasty done using high speed pooja resecting 1 cm distal clavicle protecting sperior and poster-superior capsule. Photographs were taken, the joint was copiously irrigated, portals were closed and a sterile dressing applied, patient taken to the recovery room in a sling in stable condition.   End dictation

## 2022-04-01 NOTE — BRIEF OP NOTE
Brief Postoperative Note    Patient: Ariana Segura  YOB: 1955  MRN: [id number]    Date of Procedure: 4/1/2022     Pre-Op Diagnosis: RIGHT SHOULDER ROTATOR CUFF TEAR    Post-Op Diagnosis: Same as preoperative diagnosis. Procedure(s):  RIGHT SHOULDER ARTHROSCOPY, ROTATOR CUFF REPAIR AND DISTAL CLAVICULECTOMY    Surgeon(s):  Aleksandra Zurita MD    Surgical Assistant: Physician Assistant: HEATHER Whipple    Anesthesia: General     Estimated Blood Loss (mL): Minimal    Complications: None    Specimens: * No specimens in log *     Implants:   Implant Name Type Inv.  Item Serial No.  Lot No. LRB No. Used Action   [facility] Suture Claude Triple Loaded with #2 Fiber[employer] CL   N/[employer] [id number] 1 Implanted       Drains: * No LDAs found *    Findings: see op note    Electronically Signed by HEATHER Gibbons on 4/1/2022 at 10:36 AM

## (undated) DEVICE — DYONICS 25 INFLOW TUBE SET, 3 PER BOX

## (undated) DEVICE — SUPER TURBOVAC 90 INTEGRATED CABLE WAND ICW: Brand: COBLATION

## (undated) DEVICE — CANNULA ARTHSCP L4CM DIA8MM PASSPRT BTTN

## (undated) DEVICE — STERILE POLYISOPRENE POWDER-FREE SURGICAL GLOVES: Brand: PROTEXIS

## (undated) DEVICE — KENDALL DL ECG CABLE AND LEAD WIRE SYSTEM, 3-LEAD, SINGLE PATIENT USE: Brand: KENDALL

## (undated) DEVICE — SHOULDER SUSPENSION KIT 6 PER BOX

## (undated) DEVICE — CONTINU-FLO SOLUTION SET, 2 INJECTION SITES, MALE LUER LOCK ADAPTER WITH RETRACTABLE COLLAR, LARGE BORE STOPCOCK WITH ROTATING MALE LUER LOCK EXTENSION SET, 2 INJECTION SITES, MALE LUER LOCK ADAPTER WITH RETRACTABLE COLLAR: Brand: INTERLINK/CONTINU-FLO

## (undated) DEVICE — NEEDLE SUT PASS FOR ROT CUF LABRAL REP MULTFI SCORPION

## (undated) DEVICE — TUBING, SUCTION, 1/4" X 10', STRAIGHT: Brand: MEDLINE

## (undated) DEVICE — SUT ETHLN 3-0 18IN PS2 BLK --

## (undated) DEVICE — SPONGE GZ W4XL4IN COT 12 PLY TYP VII WVN C FLD DSGN

## (undated) DEVICE — 4.5 MM FULL RADIUS STRAIGHT                                    BLADES, POWER/EP-1, YELLOW, PACKAGED                                    6 PER BOX, STERILE: Brand: DYONICS

## (undated) DEVICE — SHOULDER ARTHROSCOPY-MRMCASU: Brand: MEDLINE INDUSTRIES, INC.

## (undated) DEVICE — SOLUTION IRRIG 3000ML LAC RINGERS ARTHROMTC PLAS CONT

## (undated) DEVICE — 4.0 MM ELITE ACROMIONIZER STRAIGHT                                    DISPOSABLE BURRS, MAUVE, 10000                                    MAXIMUM RPM, PACKAGED 6 PER BOX, STERILE

## (undated) DEVICE — GLOVE ORANGE PI 8   MSG9080

## (undated) DEVICE — 3M™ TEGADERM™ TRANSPARENT FILM DRESSING FRAME STYLE, 1624W, 2-3/8 IN X 2-3/4 IN (6 CM X 7 CM), 100/CT 4CT/CASE: Brand: 3M™ TEGADERM™